# Patient Record
Sex: FEMALE | Race: WHITE | NOT HISPANIC OR LATINO
[De-identification: names, ages, dates, MRNs, and addresses within clinical notes are randomized per-mention and may not be internally consistent; named-entity substitution may affect disease eponyms.]

---

## 2020-01-21 PROBLEM — Z00.00 ENCOUNTER FOR PREVENTIVE HEALTH EXAMINATION: Status: ACTIVE | Noted: 2020-01-21

## 2020-02-03 ENCOUNTER — FORM ENCOUNTER (OUTPATIENT)
Age: 55
End: 2020-02-03

## 2020-02-04 ENCOUNTER — OUTPATIENT (OUTPATIENT)
Dept: OUTPATIENT SERVICES | Facility: HOSPITAL | Age: 55
LOS: 1 days | End: 2020-02-04
Payer: MEDICARE

## 2020-02-04 ENCOUNTER — APPOINTMENT (OUTPATIENT)
Dept: ORTHOPEDIC SURGERY | Facility: CLINIC | Age: 55
End: 2020-02-04
Payer: MEDICARE

## 2020-02-04 VITALS — SYSTOLIC BLOOD PRESSURE: 128 MMHG | OXYGEN SATURATION: 98 % | HEART RATE: 84 BPM | DIASTOLIC BLOOD PRESSURE: 80 MMHG

## 2020-02-04 DIAGNOSIS — M17.0 BILATERAL PRIMARY OSTEOARTHRITIS OF KNEE: ICD-10-CM

## 2020-02-04 DIAGNOSIS — Z78.9 OTHER SPECIFIED HEALTH STATUS: ICD-10-CM

## 2020-02-04 DIAGNOSIS — M25.551 PAIN IN RIGHT HIP: ICD-10-CM

## 2020-02-04 DIAGNOSIS — Z87.39 PERSONAL HISTORY OF OTHER DISEASES OF THE MUSCULOSKELETAL SYSTEM AND CONNECTIVE TISSUE: ICD-10-CM

## 2020-02-04 DIAGNOSIS — Z80.9 FAMILY HISTORY OF MALIGNANT NEOPLASM, UNSPECIFIED: ICD-10-CM

## 2020-02-04 DIAGNOSIS — M25.561 PAIN IN RIGHT KNEE: ICD-10-CM

## 2020-02-04 DIAGNOSIS — Z82.61 FAMILY HISTORY OF ARTHRITIS: ICD-10-CM

## 2020-02-04 DIAGNOSIS — M89.8X5 OTHER SPECIFIED DISORDERS OF BONE, THIGH: ICD-10-CM

## 2020-02-04 DIAGNOSIS — G89.29 PAIN IN RIGHT KNEE: ICD-10-CM

## 2020-02-04 PROCEDURE — 99204 OFFICE O/P NEW MOD 45 MIN: CPT

## 2020-02-04 PROCEDURE — 73564 X-RAY EXAM KNEE 4 OR MORE: CPT

## 2020-02-04 PROCEDURE — 73564 X-RAY EXAM KNEE 4 OR MORE: CPT | Mod: 26,50

## 2020-02-04 RX ORDER — IBUPROFEN 800 MG/1
800 TABLET ORAL
Refills: 0 | Status: ACTIVE | COMMUNITY

## 2020-02-04 RX ORDER — HYALURONATE SODIUM, STABILIZED 88 MG/4 ML
88 SYRINGE (ML) INTRAARTICULAR
Qty: 2 | Refills: 0 | Status: ACTIVE | COMMUNITY
Start: 2020-02-04

## 2020-02-04 RX ORDER — ALPRAZOLAM 2 MG/1
TABLET ORAL
Refills: 0 | Status: ACTIVE | COMMUNITY

## 2020-02-04 RX ORDER — OXYCODONE HYDROCHLORIDE 30 MG/1
TABLET ORAL
Refills: 0 | Status: ACTIVE | COMMUNITY

## 2020-02-04 RX ORDER — LEVOTHYROXINE SODIUM 0.07 MG/1
75 TABLET ORAL
Refills: 0 | Status: ACTIVE | COMMUNITY

## 2020-02-04 NOTE — PHYSICAL EXAM
[de-identified] : Xray Bilateral Knees - Multiple views were reviewed with the patient in detail.  \par \par INTERPRETATION: Indication: Knee pain \par \par Four views of each knee demonstrate no fracture or dislocation. There is an \par osteochondroma arising from the right medial femoral diametaphysis. \par Osteoarthritis is present bilaterally with chondrocalcinosis. There is a \par small right knee effusion.  [de-identified] : General: Well-nourished, well-developed, alert, and in no acute distress.\par Head: Normocephalic.\par Eyes: Pupils equal round reactive to light and accommodation, extraocular muscles intact, normal sclera.\par Nose: No nasal discharge.\par Cardiac: Regular rate. Extremities are warm and well perfused. Distal pulses are symmetric bilaterally.\par Respiratory: No labored breathing.\par Extremities: Sensation is intact distally bilaterally.  Distal pulses are symmetric bilaterally\par Neurologic: No focal deficits\par Skin: Normal skin color, texture, and turgor\par Psychiatric: Normal affect\par \par RIGHT KNEE:\par \par Inspection: no bruising, erythema, MILD VARUS ANGULATION\par Joint Effusion:MILD-MODERATE\par ROM: Knee Flexion 130 WITH MILD PAIN AT END FLEXION , Knee Extension 0\par Palpation:LATERAL>MEDIAL JOINT LINE PAIN, PAIN AT PATELLAR FACET, PAIN AT DISTAL MEDIAL FEMUR METAPHYSIS, BONY NODULARITY NOTED AT METAPHYSIS AROUND ADDUCTOR INSERTION WITH PINPOINT PAIN, MILD PAIN AT MFC, No pain at  patellar tendon, LFC, Medial/Lateral Tibial Plateau\par Leg Length Discrepancy:no\par Patella: no apprehension\par Distal Pulses: normal\par Lower Extremity Strength:normal, 5/5 \par Lower Extremity Reflexes:normal, 2+\par Lower Extremity Sensation: normal\par \par Special Tests:\par [place]:POSITIVE MEDIALLY\par Pool: Negative\par Anterior Drawer:Negative\par Posterior Drawer:Negative \par Varus/Valgus:Negative, no instability\par PAIN WITH RESISTED THIGH ADDUCTION\par \par LEFT KNEE:\par \par Inspection: no bruising, swelling, erythema\par Joint Effusion:no \par ROM: Knee Flexion 130 , Knee Extension 0\par Palpation:MILD MEDIAL JOINT LINE PAIN, No pain at  patellar tendon, MFC/LFC, Medial/Lateral Tibial Plateau\par Leg Length Discrepancy:no\par Patella: no apprehension\par Distal Pulses: normal\par Lower Extremity Strength:normal, 5/5 \par Lower Extremity Reflexes:normal, 2+\par Lower Extremity Sensation: normal\par \par Special Tests:\par [facility]:Negative \par Pool: Negative\par Anterior Drawer:Negative\par Posterior Drawer:Negative \par Varus/Valgus:Negative, no instability\par \par LEFT HIP:\par \par Inspection: no bruising, erythema, rash, deformity \par Palpation: No Greater Trochanter pain , ITB pain , Hip Flexor pain  , Piriformis pain , Proximal Hamstring Pain , Groin pain \par ROM: normal Internal Rotation , External Rotation\par Strength: 5/5 Hip Flexion, Hip Extension, Hip Abduction, Hip Adduction\par \par Distal Pulses: normal\par Lower Extremity Sensation: normal \par Patellar/Achilles Reflex: normal\par \par Special Tests:\par [place]: NEGATIVE \par Log Roll: NEGATIVE\par FABERE: POSITIVE\par FADIR: POSITIVE\par \par \par RIGHT HIP:\par \par Inspection: no bruising, erythema, rash, deformity \par Palpation: No Greater Trochanter pain , ITB pain , Hip Flexor pain  , Piriformis pain , Proximal Hamstring Pain , Groin pain \par ROM: normal Internal Rotation , External Rotation\par Strength: 5/5 Hip Flexion, Hip Extension, Hip Abduction, Hip Adduction\par \par Distal Pulses: normal\par Lower Extremity Sensation: normal \par Patellar/Achilles Reflex: normal\par \par Special Tests:\par [place]: NEGATIVE \par Log Roll: NEGATIVE\par FABERE: NEGATIVE\par FADIR: NEGATIVE\par \par \par

## 2020-02-04 NOTE — REVIEW OF SYSTEMS
[Negative] : Heme/Lymph [Joint Swelling] : joint swelling [Joint Pain] : joint pain [Chills] : chills [Eye Pain] : eye pain [Feeling Tired] : fatigue [Sore Throat] : sore throat [Cough] : cough [Headache] : headache [Depression] : depression [Anxiety] : anxiety [Sleep Disturbances] : ~T sleep disturbances [Feeling Weak] : feeling weak [Muscle Weakness] : muscle weakness [Hot Flashes] : hot flashes

## 2020-02-04 NOTE — DISCUSSION/SUMMARY
[Medication Risks Reviewed] : Medication risks reviewed [de-identified] : The patient is a 54 year woman with history of hypothyroidism presenting with chronic, bilateral knee pain, right worse than left.  She has evidence of bilateral knee osteoarthritis with chondrocalcinosis.  Her right knee has evidence of distal medial femoral metadiaphyseal exostosis, possible osteochondroma.  Overall, her symptoms appear to be intraarticular, though she is symptomatic at the exostosis.\par \par She also complains of chronic right anterior hip pain with limitation in ADL and ambulation.  Cannot rule out AVN, OA.\par \par Imaging was reviewed with the patient in detail.  All questions were answered appropriately.\par \par \par MRI of the right knee ordered today.\par \par MRI of the right hip ordered today.\par \par Bilateral Monovisc injections ordered today.\par \par Continue pain regimen with Oxycodone per PMD.\par \par Patient counseled on rest and activity modification.  She was given a list of home exercises.  She was instructed to avoid high impact activity.\par \par Pending MRI results, we will discuss treatment options.  HA injections ordered in interim,\par \par Follow-up after MRI.\par \par Patient appreciates and agrees with current plan.\par \par This note was generated using dragon medical dictation software.  A reasonable effort has been made for proofreading its contents, but typos may still remain.  If there are any questions or points of clarification needed please notify my office.\par \par \par
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
## 2021-12-18 ENCOUNTER — HOSPITAL ENCOUNTER (INPATIENT)
Dept: HOSPITAL 74 - JER | Age: 56
LOS: 3 days | Discharge: ANOTHER HOSPITAL | DRG: 432 | End: 2021-12-21
Attending: INTERNAL MEDICINE | Admitting: INTERNAL MEDICINE
Payer: COMMERCIAL

## 2021-12-18 VITALS — BODY MASS INDEX: 27.4 KG/M2

## 2021-12-18 DIAGNOSIS — K70.31: Primary | ICD-10-CM

## 2021-12-18 DIAGNOSIS — E66.9: ICD-10-CM

## 2021-12-18 DIAGNOSIS — E87.2: ICD-10-CM

## 2021-12-18 DIAGNOSIS — B95.7: ICD-10-CM

## 2021-12-18 DIAGNOSIS — Z79.52: ICD-10-CM

## 2021-12-18 DIAGNOSIS — F10.10: ICD-10-CM

## 2021-12-18 DIAGNOSIS — K72.00: ICD-10-CM

## 2021-12-18 DIAGNOSIS — D69.6: ICD-10-CM

## 2021-12-18 DIAGNOSIS — F41.8: ICD-10-CM

## 2021-12-18 DIAGNOSIS — E87.70: ICD-10-CM

## 2021-12-18 DIAGNOSIS — R78.81: ICD-10-CM

## 2021-12-18 DIAGNOSIS — D64.9: ICD-10-CM

## 2021-12-18 DIAGNOSIS — R00.0: ICD-10-CM

## 2021-12-18 DIAGNOSIS — N17.9: ICD-10-CM

## 2021-12-18 LAB
ALBUMIN SERPL-MCNC: 2.2 G/DL (ref 3.4–5)
ALP SERPL-CCNC: 242 U/L (ref 45–117)
ALT SERPL-CCNC: 62 U/L (ref 13–61)
ANION GAP SERPL CALC-SCNC: 11 MMOL/L (ref 8–16)
ANISOCYTOSIS BLD QL: (no result)
APTT BLD: 32.7 SECONDS (ref 25.2–36.5)
AST SERPL-CCNC: 56 U/L (ref 15–37)
BASE EXCESS BLDV CALC-SCNC: -4.9 MMOL/L (ref -2–2)
BILIRUB CONJ SERPL-MCNC: 13.9 MG/DL (ref 0–0.2)
BILIRUB SERPL-MCNC: 17.8 MG/DL (ref 0.2–1)
BNP SERPL-MCNC: 1420.7 PG/ML (ref 5–125)
BUN SERPL-MCNC: 38.9 MG/DL (ref 7–18)
CALCIUM SERPL-MCNC: 7.9 MG/DL (ref 8.5–10.1)
CHLORIDE SERPL-SCNC: 104 MMOL/L (ref 98–107)
CO2 SERPL-SCNC: 20 MMOL/L (ref 21–32)
CREAT SERPL-MCNC: 1.7 MG/DL (ref 0.55–1.3)
DACRYOCYTES BLD QL SMEAR: (no result)
DEPRECATED RDW RBC AUTO: 22.7 % (ref 11.6–15.6)
GLUCOSE SERPL-MCNC: 151 MG/DL (ref 74–106)
HCT VFR BLD CALC: 27.7 % (ref 32.4–45.2)
HCT VFR BLDV CALC: 29 % (ref 32.4–45.2)
HGB BLD-MCNC: 9.4 GM/DL (ref 10.7–15.3)
INR BLD: 1.9 (ref 0.83–1.09)
LACTATE SERPL-MCNC: 3.8 MMOL/L (ref 0.4–2)
LIPASE SERPL-CCNC: 587 U/L (ref 73–393)
MACROCYTES BLD QL: (no result)
MAGNESIUM SERPL-MCNC: 2 MG/DL (ref 1.8–2.4)
MCH RBC QN AUTO: 33.7 PG (ref 25.7–33.7)
MCHC RBC AUTO-ENTMCNC: 34 G/DL (ref 32–36)
MCV RBC: 99.1 FL (ref 80–96)
PCO2 BLDV: 37.6 MMHG (ref 38–52)
PH BLDV: 7.35 [PH] (ref 7.31–7.41)
PHOSPHATE SERPL-MCNC: 3.1 MG/DL (ref 2.5–4.9)
PLATELET # BLD AUTO: 77 10^3/UL (ref 134–434)
PLATELET BLD QL SMEAR: (no result)
PMV BLD: 9.9 FL (ref 7.5–11.1)
PROT SERPL-MCNC: 6.2 G/DL (ref 6.4–8.2)
PT PNL PPP: 22.4 SEC (ref 9.7–13)
RBC # BLD AUTO: 2.79 M/MM3 (ref 3.6–5.2)
SAO2 % BLDV: 59.7 % (ref 70–80)
SODIUM SERPL-SCNC: 136 MMOL/L (ref 136–145)
TARGETS BLD QL SMEAR: (no result)
WBC # BLD AUTO: 11.3 K/MM3 (ref 4–10)

## 2021-12-18 PROCEDURE — C9803 HOPD COVID-19 SPEC COLLECT: HCPCS

## 2021-12-18 PROCEDURE — U0005 INFEC AGEN DETEC AMPLI PROBE: HCPCS

## 2021-12-18 PROCEDURE — U0003 INFECTIOUS AGENT DETECTION BY NUCLEIC ACID (DNA OR RNA); SEVERE ACUTE RESPIRATORY SYNDROME CORONAVIRUS 2 (SARS-COV-2) (CORONAVIRUS DISEASE [COVID-19]), AMPLIFIED PROBE TECHNIQUE, MAKING USE OF HIGH THROUGHPUT TECHNOLOGIES AS DESCRIBED BY CMS-2020-01-R: HCPCS

## 2021-12-18 PROCEDURE — P9047 ALBUMIN (HUMAN), 25%, 50ML: HCPCS

## 2021-12-18 PROCEDURE — G0008 ADMIN INFLUENZA VIRUS VAC: HCPCS

## 2021-12-19 LAB
APPEARANCE UR: CLEAR
BASOPHILS # BLD: 0.5 % (ref 0–2)
BILIRUB UR STRIP.AUTO-MCNC: NEGATIVE MG/DL
COLOR UR: (no result)
DEPRECATED RDW RBC AUTO: 22.7 % (ref 11.6–15.6)
EOSINOPHIL # BLD: 0.3 % (ref 0–4.5)
HCT VFR BLD CALC: 26.9 % (ref 32.4–45.2)
HGB BLD-MCNC: 9.2 GM/DL (ref 10.7–15.3)
INR BLD: 2.08 (ref 0.83–1.09)
IRON SERPL-MCNC: 72 UG/DL (ref 50–175)
KETONES UR QL STRIP: NEGATIVE
LACTATE SERPL-MCNC: 2.7 MMOL/L (ref 0.4–2)
LEUKOCYTE ESTERASE UR QL STRIP.AUTO: NEGATIVE
LYMPHOCYTES # BLD: 7.2 % (ref 8–40)
MCH RBC QN AUTO: 33.7 PG (ref 25.7–33.7)
MCHC RBC AUTO-ENTMCNC: 34.2 G/DL (ref 32–36)
MCV RBC: 98.5 FL (ref 80–96)
MONOCYTES # BLD AUTO: 5.3 % (ref 3.8–10.2)
NEUTROPHILS # BLD: 86.7 % (ref 42.8–82.8)
NITRITE UR QL STRIP: NEGATIVE
PH UR: 5.5 [PH] (ref 5–8)
PLATELET # BLD AUTO: 78 10^3/UL (ref 134–434)
PMV BLD: 9.6 FL (ref 7.5–11.1)
PROT UR QL STRIP: NEGATIVE
PROT UR QL STRIP: NEGATIVE
PT PNL PPP: 23.5 SEC (ref 9.7–13)
RBC # BLD AUTO: 2.73 M/MM3 (ref 3.6–5.2)
SP GR UR: 1.01 (ref 1.01–1.03)
TIBC SERPL-MCNC: 162 UG/DL (ref 250–450)
UROBILINOGEN UR STRIP-MCNC: 0.2 MG/DL (ref 0.2–1)
WBC # BLD AUTO: 11.4 K/MM3 (ref 4–10)

## 2021-12-19 RX ADMIN — FUROSEMIDE SCH MG: 10 INJECTION, SOLUTION INTRAVENOUS at 12:00

## 2021-12-19 RX ADMIN — LACTULOSE SCH GM: 20 SOLUTION ORAL at 11:30

## 2021-12-19 RX ADMIN — LACTULOSE SCH GM: 20 SOLUTION ORAL at 21:56

## 2021-12-19 RX ADMIN — ESCITALOPRAM OXALATE SCH MG: 10 TABLET, FILM COATED ORAL at 11:30

## 2021-12-19 RX ADMIN — RIFAXIMIN SCH MG: 550 TABLET ORAL at 21:56

## 2021-12-19 RX ADMIN — METOLAZONE SCH MG: 2.5 TABLET ORAL at 11:30

## 2021-12-19 RX ADMIN — OXYCODONE HYDROCHLORIDE AND ACETAMINOPHEN SCH MG: 500 TABLET ORAL at 11:30

## 2021-12-19 RX ADMIN — LEVOTHYROXINE SODIUM SCH MCG: 75 TABLET ORAL at 11:30

## 2021-12-19 RX ADMIN — LACTULOSE SCH GM: 20 SOLUTION ORAL at 15:55

## 2021-12-19 RX ADMIN — Medication SCH MG: at 21:55

## 2021-12-19 RX ADMIN — Medication SCH MG: at 11:30

## 2021-12-19 RX ADMIN — GABAPENTIN SCH MG: 300 CAPSULE ORAL at 15:57

## 2021-12-19 RX ADMIN — FAMOTIDINE SCH MG: 20 TABLET ORAL at 11:30

## 2021-12-19 RX ADMIN — GABAPENTIN SCH MG: 300 CAPSULE ORAL at 11:30

## 2021-12-19 RX ADMIN — METOLAZONE SCH MG: 2.5 TABLET ORAL at 13:28

## 2021-12-19 RX ADMIN — FUROSEMIDE SCH MG: 10 INJECTION, SOLUTION INTRAVENOUS at 15:54

## 2021-12-19 RX ADMIN — SPIRONOLACTONE SCH: 25 TABLET, FILM COATED ORAL at 13:29

## 2021-12-19 RX ADMIN — GABAPENTIN SCH MG: 300 CAPSULE ORAL at 21:55

## 2021-12-19 RX ADMIN — TRAZODONE HYDROCHLORIDE SCH MG: 50 TABLET ORAL at 11:30

## 2021-12-19 RX ADMIN — RIFAXIMIN SCH MG: 550 TABLET ORAL at 11:30

## 2021-12-19 RX ADMIN — ALBUMIN (HUMAN) SCH GM: 0.25 INJECTION, SOLUTION INTRAVENOUS at 13:27

## 2021-12-19 RX ADMIN — NICOTINE SCH: 14 PATCH, EXTENDED RELEASE TRANSDERMAL at 12:01

## 2021-12-20 LAB
ALBUMIN SERPL-MCNC: 2.3 G/DL (ref 3.4–5)
ALP SERPL-CCNC: 179 U/L (ref 45–117)
ALT SERPL-CCNC: 48 U/L (ref 13–61)
ANION GAP SERPL CALC-SCNC: 11 MMOL/L (ref 8–16)
AST SERPL-CCNC: 58 U/L (ref 15–37)
BASOPHILS # BLD: 0.3 % (ref 0–2)
BILIRUB SERPL-MCNC: 16.8 MG/DL (ref 0.2–1)
BUN SERPL-MCNC: 37.7 MG/DL (ref 7–18)
CALCIUM SERPL-MCNC: 8.6 MG/DL (ref 8.5–10.1)
CHLORIDE SERPL-SCNC: 100 MMOL/L (ref 98–107)
CO2 SERPL-SCNC: 25 MMOL/L (ref 21–32)
CREAT SERPL-MCNC: 1.6 MG/DL (ref 0.55–1.3)
DEPRECATED RDW RBC AUTO: 22.3 % (ref 11.6–15.6)
EOSINOPHIL # BLD: 0.3 % (ref 0–4.5)
GLUCOSE SERPL-MCNC: 96 MG/DL (ref 74–106)
HCT VFR BLD CALC: 26.7 % (ref 32.4–45.2)
HGB BLD-MCNC: 9.2 GM/DL (ref 10.7–15.3)
INR BLD: 2.04 (ref 0.83–1.09)
LYMPHOCYTES # BLD: 8.1 % (ref 8–40)
MAGNESIUM SERPL-MCNC: 2.1 MG/DL (ref 1.8–2.4)
MCH RBC QN AUTO: 34.3 PG (ref 25.7–33.7)
MCHC RBC AUTO-ENTMCNC: 34.3 G/DL (ref 32–36)
MCV RBC: 100.1 FL (ref 80–96)
MONOCYTES # BLD AUTO: 5.7 % (ref 3.8–10.2)
NEUTROPHILS # BLD: 85.6 % (ref 42.8–82.8)
PHOSPHATE SERPL-MCNC: 3.9 MG/DL (ref 2.5–4.9)
PLATELET # BLD AUTO: 69 10^3/UL (ref 134–434)
PMV BLD: 9.9 FL (ref 7.5–11.1)
PROT SERPL-MCNC: 6 G/DL (ref 6.4–8.2)
PT PNL PPP: 24.1 SEC (ref 9.7–13)
RBC # BLD AUTO: 2.67 M/MM3 (ref 3.6–5.2)
SODIUM SERPL-SCNC: 136 MMOL/L (ref 136–145)
WBC # BLD AUTO: 9.5 K/MM3 (ref 4–10)

## 2021-12-20 RX ADMIN — FUROSEMIDE SCH MG: 10 INJECTION, SOLUTION INTRAVENOUS at 15:05

## 2021-12-20 RX ADMIN — FAMOTIDINE SCH MG: 20 TABLET ORAL at 09:53

## 2021-12-20 RX ADMIN — LACTULOSE SCH GM: 20 SOLUTION ORAL at 22:39

## 2021-12-20 RX ADMIN — FUROSEMIDE SCH MG: 10 INJECTION, SOLUTION INTRAVENOUS at 07:09

## 2021-12-20 RX ADMIN — Medication SCH MG: at 22:19

## 2021-12-20 RX ADMIN — NICOTINE SCH MG: 14 PATCH, EXTENDED RELEASE TRANSDERMAL at 09:54

## 2021-12-20 RX ADMIN — LEVOTHYROXINE SODIUM SCH MCG: 75 TABLET ORAL at 06:26

## 2021-12-20 RX ADMIN — RIFAXIMIN SCH MG: 550 TABLET ORAL at 22:19

## 2021-12-20 RX ADMIN — ESCITALOPRAM OXALATE SCH MG: 10 TABLET, FILM COATED ORAL at 09:52

## 2021-12-20 RX ADMIN — GABAPENTIN SCH MG: 300 CAPSULE ORAL at 06:26

## 2021-12-20 RX ADMIN — METOLAZONE SCH MG: 2.5 TABLET ORAL at 06:25

## 2021-12-20 RX ADMIN — GABAPENTIN SCH MG: 300 CAPSULE ORAL at 14:26

## 2021-12-20 RX ADMIN — ALBUMIN (HUMAN) SCH GM: 0.25 INJECTION, SOLUTION INTRAVENOUS at 17:38

## 2021-12-20 RX ADMIN — METOLAZONE SCH MG: 2.5 TABLET ORAL at 14:24

## 2021-12-20 RX ADMIN — TRAZODONE HYDROCHLORIDE SCH MG: 50 TABLET ORAL at 09:52

## 2021-12-20 RX ADMIN — SPIRONOLACTONE SCH MG: 25 TABLET, FILM COATED ORAL at 09:51

## 2021-12-20 RX ADMIN — Medication SCH MG: at 09:53

## 2021-12-20 RX ADMIN — ALBUMIN (HUMAN) SCH GM: 0.25 INJECTION, SOLUTION INTRAVENOUS at 00:04

## 2021-12-20 RX ADMIN — LACTULOSE SCH GM: 20 SOLUTION ORAL at 14:25

## 2021-12-20 RX ADMIN — RIFAXIMIN SCH MG: 550 TABLET ORAL at 09:54

## 2021-12-20 RX ADMIN — OXYCODONE HYDROCHLORIDE AND ACETAMINOPHEN SCH MG: 500 TABLET ORAL at 09:53

## 2021-12-20 RX ADMIN — LACTULOSE SCH GM: 20 SOLUTION ORAL at 06:26

## 2021-12-20 RX ADMIN — PREDNISONE SCH MG: 10 TABLET ORAL at 09:51

## 2021-12-20 RX ADMIN — ALBUMIN (HUMAN) SCH GM: 0.25 INJECTION, SOLUTION INTRAVENOUS at 22:33

## 2021-12-20 RX ADMIN — NICOTINE SCH: 14 PATCH, EXTENDED RELEASE TRANSDERMAL at 09:58

## 2021-12-20 RX ADMIN — GABAPENTIN SCH MG: 300 CAPSULE ORAL at 22:19

## 2021-12-21 VITALS — SYSTOLIC BLOOD PRESSURE: 99 MMHG | DIASTOLIC BLOOD PRESSURE: 62 MMHG | TEMPERATURE: 98.4 F | HEART RATE: 82 BPM

## 2021-12-21 LAB
ALBUMIN SERPL-MCNC: 2.2 G/DL (ref 3.4–5)
ALP SERPL-CCNC: 172 U/L (ref 45–117)
ALT SERPL-CCNC: 42 U/L (ref 13–61)
ANION GAP SERPL CALC-SCNC: 10 MMOL/L (ref 8–16)
AST SERPL-CCNC: 64 U/L (ref 15–37)
BASOPHILS # BLD: 0.6 % (ref 0–2)
BILIRUB CONJ SERPL-MCNC: 10.9 MG/DL (ref 0–0.2)
BILIRUB SERPL-MCNC: 15 MG/DL (ref 0.2–1)
BUN SERPL-MCNC: 42.8 MG/DL (ref 7–18)
CALCIUM SERPL-MCNC: 8.2 MG/DL (ref 8.5–10.1)
CHLORIDE SERPL-SCNC: 96 MMOL/L (ref 98–107)
CO2 SERPL-SCNC: 30 MMOL/L (ref 21–32)
CREAT SERPL-MCNC: 1.9 MG/DL (ref 0.55–1.3)
DEPRECATED RDW RBC AUTO: 22.1 % (ref 11.6–15.6)
EOSINOPHIL # BLD: 0 % (ref 0–4.5)
GLUCOSE SERPL-MCNC: 120 MG/DL (ref 74–106)
HCT VFR BLD CALC: 25.9 % (ref 32.4–45.2)
HGB BLD-MCNC: 8.8 GM/DL (ref 10.7–15.3)
INR BLD: 2.4 (ref 0.83–1.09)
LYMPHOCYTES # BLD: 6.1 % (ref 8–40)
MCH RBC QN AUTO: 33.3 PG (ref 25.7–33.7)
MCHC RBC AUTO-ENTMCNC: 33.9 G/DL (ref 32–36)
MCV RBC: 98.3 FL (ref 80–96)
MONOCYTES # BLD AUTO: 3.6 % (ref 3.8–10.2)
NEUTROPHILS # BLD: 89.7 % (ref 42.8–82.8)
PLATELET # BLD AUTO: 69 10^3/UL (ref 134–434)
PMV BLD: 9.4 FL (ref 7.5–11.1)
PROT SERPL-MCNC: 5.6 G/DL (ref 6.4–8.2)
PT PNL PPP: 27.1 SEC (ref 9.7–13)
RBC # BLD AUTO: 2.64 M/MM3 (ref 3.6–5.2)
SODIUM SERPL-SCNC: 135 MMOL/L (ref 136–145)
WBC # BLD AUTO: 8.9 K/MM3 (ref 4–10)

## 2021-12-21 RX ADMIN — RIFAXIMIN SCH MG: 550 TABLET ORAL at 10:12

## 2021-12-21 RX ADMIN — Medication SCH MG: at 21:02

## 2021-12-21 RX ADMIN — NICOTINE SCH: 14 PATCH, EXTENDED RELEASE TRANSDERMAL at 10:13

## 2021-12-21 RX ADMIN — TRAZODONE HYDROCHLORIDE SCH MG: 50 TABLET ORAL at 10:12

## 2021-12-21 RX ADMIN — PREDNISONE SCH MG: 10 TABLET ORAL at 10:12

## 2021-12-21 RX ADMIN — LACTULOSE SCH GM: 20 SOLUTION ORAL at 05:53

## 2021-12-21 RX ADMIN — FUROSEMIDE SCH: 10 INJECTION, SOLUTION INTRAVENOUS at 15:06

## 2021-12-21 RX ADMIN — LACTULOSE SCH GM: 20 SOLUTION ORAL at 13:46

## 2021-12-21 RX ADMIN — GABAPENTIN SCH MG: 300 CAPSULE ORAL at 05:54

## 2021-12-21 RX ADMIN — GABAPENTIN SCH MG: 300 CAPSULE ORAL at 21:02

## 2021-12-21 RX ADMIN — LEVOTHYROXINE SODIUM SCH MCG: 75 TABLET ORAL at 06:00

## 2021-12-21 RX ADMIN — FAMOTIDINE SCH MG: 20 TABLET ORAL at 10:12

## 2021-12-21 RX ADMIN — METOLAZONE SCH MG: 2.5 TABLET ORAL at 06:55

## 2021-12-21 RX ADMIN — Medication SCH MG: at 10:12

## 2021-12-21 RX ADMIN — SPIRONOLACTONE SCH MG: 25 TABLET, FILM COATED ORAL at 10:12

## 2021-12-21 RX ADMIN — GABAPENTIN SCH: 300 CAPSULE ORAL at 13:49

## 2021-12-21 RX ADMIN — METOLAZONE SCH: 2.5 TABLET ORAL at 15:06

## 2021-12-21 RX ADMIN — OXYCODONE HYDROCHLORIDE AND ACETAMINOPHEN SCH MG: 500 TABLET ORAL at 10:12

## 2021-12-21 RX ADMIN — ESCITALOPRAM OXALATE SCH MG: 10 TABLET, FILM COATED ORAL at 10:12

## 2021-12-21 RX ADMIN — LACTULOSE SCH GM: 20 SOLUTION ORAL at 21:04

## 2021-12-21 RX ADMIN — FUROSEMIDE SCH MG: 10 INJECTION, SOLUTION INTRAVENOUS at 05:53

## 2021-12-21 RX ADMIN — RIFAXIMIN SCH MG: 550 TABLET ORAL at 21:02
